# Patient Record
Sex: FEMALE | Race: WHITE | NOT HISPANIC OR LATINO | Employment: OTHER | URBAN - METROPOLITAN AREA
[De-identification: names, ages, dates, MRNs, and addresses within clinical notes are randomized per-mention and may not be internally consistent; named-entity substitution may affect disease eponyms.]

---

## 2020-02-13 ENCOUNTER — NON-PROVIDER VISIT (OUTPATIENT)
Dept: CARDIOLOGY | Facility: MEDICAL CENTER | Age: 85
End: 2020-02-13
Payer: COMMERCIAL

## 2020-02-13 DIAGNOSIS — I48.20 CHRONIC ATRIAL FIBRILLATION (HCC): ICD-10-CM

## 2020-02-13 LAB — EKG IMPRESSION: NORMAL

## 2020-02-13 PROCEDURE — G0423 INTENS CARDIAC REHAB NO EXER: HCPCS | Mod: 59 | Performed by: INTERNAL MEDICINE

## 2020-02-13 PROCEDURE — G0422 INTENS CARDIAC REHAB W/EXERC: HCPCS | Performed by: INTERNAL MEDICINE

## 2020-02-13 ASSESSMENT — PATIENT HEALTH QUESTIONNAIRE - PHQ9
SUM OF ALL RESPONSES TO PHQ QUESTIONS 1-9: 11
4. FEELING TIRED OR HAVING LITTLE ENERGY: 2
8. MOVING OR SPEAKING SO SLOWLY THAT OTHER PEOPLE COULD HAVE NOTICED. OR THE OPPOSITE, BEING SO FIGETY OR RESTLESS THAT YOU HAVE BEEN MOVING AROUND A LOT MORE THAN USUAL: 2
7. TROUBLE CONCENTRATING ON THINGS, SUCH AS READING THE NEWSPAPER OR WATCHING TELEVISION: 0
2. FEELING DOWN, DEPRESSED, IRRITABLE, OR HOPELESS: 1
1. LITTLE INTEREST OR PLEASURE IN DOING THINGS: 2
SUM OF ALL RESPONSES TO PHQ9 QUESTIONS 1 AND 2: 3
9. THOUGHTS THAT YOU WOULD BE BETTER OFF DEAD, OR OF HURTING YOURSELF: 0
5. POOR APPETITE OR OVEREATING: 2
SUM OF ALL RESPONSES TO PHQ QUESTIONS 1-9: 11
6. FEELING BAD ABOUT YOURSELF - OR THAT YOU ARE A FAILURE OR HAVE LET YOURSELF OR YOUR FAMILY DOWN: 0
3. TROUBLE FALLING OR STAYING ASLEEP OR SLEEPING TOO MUCH: 2

## 2020-02-13 NOTE — PROGRESS NOTES
Intensive Cardiac Rehabilitation (ICR) and Individual Treatment Plan (ITP) reviewed and signed.  Shirley Dickson MD

## 2020-02-13 NOTE — PROGRESS NOTES
Individualized Treatment Plan   Cardiac Rehab Individual Treatment Plan  Initial/Reassessment/Discharge Assessment/ ReAssessment/ Discharge: Initial 20 Session #     1   MRN: [id number] Allergies: Penicillins   Patient Name: Randee Franklin : 9/3/1934 Risk Stratification: High    Diagnoses:   1. Chronic atrial fibrillation     Age: 85 y.o. Physician: Rosaline Kwong M.D.    Date of Event: 01/10/20 Specialist: David   Risk Factors:  Hyperlipidemia, Age, Female > 55   Exercise Nutrition Other Core Components/ Risk Factors Psychosocial   Stages of change Stages of change Stages of change Stages of change   Preparation Preparation Preparation Preparation   Fitness Test Lipids Learning Barriers Psychosocial Plan   DASI: (n/a) Available: Yes Learning Barriers: Vision, Ready to Learn Psychosocial Plan: Yes   DIST: 859 Date: 01/10/20 Family Support Goals   Max HR: 101 Total: 162 mg/dL Yes Assess presence or absence of depression using a valid screening: Yes   Max BP: Peak Ex BP: 142/61 Tri mg/dL Lives: Alone(Has two daughters that live in the area.) Use Stress Management: Yes   RPE: 13 HDL: 71 mg/dL Other Risk Factors Plan Adverse Events: Yes   SPO2: 100 %       MET: (P) 2.23 LDL: 72 mg/dL Other Risk Factors/Plan: Yes Unexpected Events: Yes   EF= (not available) Diabetes Tobacco Use Intervention   Ambulatory Status Diabetes: No Social History     Tobacco Use   Smoking Status Never Smoker    Behavioral Health Consult: Yes   Fall Risk Assessed: Yes HbA1C: 5.8 % Date: 01/10/20 Goals Physician Referral: No   Exercise Ambulatory Status Assist Devices: None Monitors BS at Home: No Educate / Review and have understanding of cardiac disease prevention: Identifies Stressors: Yes   Exercise Plan Frequency: (n/a) Random BS: 87(1/10/2020)  Drug Intervention: N/A     Weight Management  Outcome Survey Tools   Goals Weight: 79.6 kg (175 lb 6.4 oz) Educate / Review and have understanding of cardiac disease prevention: Yes  "FP QOL Overall Score: 19.53   Home Exercise: Yes Height: 170.2 cm (5' 7\") Medication Compliance: Yes PHQ-9: 11   Mode: Walk, Other(Dance) BMI (Calculated): 27.47 Complete Tobacco Cessation: Education   Duration: 10 minutes Goal weight: 155lbs Complete Tobacco Cessation: N/A MBSR Lectures/Videos: Yes   Frequency: Stay active 7 days a week; Walk or dancing for 10 mins everyday.  ICR 3 days a week Waist: 36 inch Intervention Psychosocial Education: Coping Techniques, Positive Support System, S/S Depression, MBSR Lectures   Intervention Social History     Substance and Sexual Activity   Alcohol Use No    Smoking Cessation Referral: N/A     Intervention: Yes Nutrition Plan Ind. Education / Counseling: N/A    Exercise Prescription Nutrition Plan: Yes Tobacco Adjunct: N/A    Mode: Treadmill, NuStep, UBE, Airdyne Nutrition Related Target Goals Healthy Heart Education: Class Schedule Given, Medications Reviewed, Patient Education Binder Provided, Risk Factors Discussed, Videos Viewed per [place]    Frequency: 3 days/week LDL-C <100 if trig. > 200: Yes Weekly Lectures/ Videos/ Interactive Cooking School: Yes    Duration: 15 to 20 mins LDL-C < 70 for high risk patient:  LDL-C<70 for high risk patients: Yes Education    Intensity: 11-13 RPE  Healthy Heart Education: Class Schedule Given, Medications Reviewed, Patient Education Binder Provided, Risk Factors Discussed, Videos Viewed per Nick    METS: 1.0-3.0 Non HDL-C Should be < 130:  Non HDL-C Should be < 130: Yes Hypertension Management   (Active Problem)    Progression: ^ increments of 1-3 to THR/RPE as tolerated      THR: THR: (P) Other (see comment)(110 to 120 BPM) HbA1C < 7%: Yes Resting BP: (P) 128/56    Angina with Exercise?   Angina with Exercise: No   BMI < 25: Yes Hypertension Education      Dietary Goal: >=58 Rate Your Plate     Resistance Training? Resistance Training: Yes Rate your Plate: Pre(53) Lectures/ Videos/ Cooking School: Yes    Education Intervention " "Hypertension Goals    Yes Dietician Consult/Class: Pending Medication Adherence : Yes    Equipment Orientation, Exercise Safety, S/S to Report, RPE Scale, Warm Up / Cool Down, Physically Active Nurse/Patient Discussion: Yes Home Self Monitoring : Yes    Exercise “Target Goals” Diabetes Ed Referral: N/A     Start Individual Exercise Rx Yes Discuss Maintenance /Wt Loss: Yes       Attend Cooking School: Pending     BP < 140/90 or < 130/80 if DM or CKD Yes Education       Nutrition Education: Healthy Plant Based Eating, Sodium Reduction Cooking/ Lectures/ Cooking School/  RD 1:1     Aerobic activity 30 + min / day 5 days / wk Yes        Initial Assessment  Heart Sounds: (P) S1S2 audible and regular.          Lung Sounds: (P) Clear but diminshed         Edema: (P) None     Right Peripheral Pulses:  Carotid: (P) 2+  Radial: (P) 2+  Dorsalis Pedis: (P) 1+  Posterior Tibialis: (P) 1+      Left Peripheral Pulses:  Carotid: (P) 2+  Radial: (P) 2+  Dorsalis Pedis: (P) 1+  Posterior Tibialis: (P) 1+       Incision: (P) None      Color: (P) Skin is pink and dry    Frame Size: (P) Small       Cognitive Assessment: (P) Memory is good with a pleasant affect.    Fall Assessment:    Gait: (P) Slightly unsteady(Randee was asked to bring her cane for support.)  Balance: (P) Poor  Upper Body: (P) Full ROM  Lower Body: (P) Full ROM   Recent Falls: (P) Last October fell when [place] bump[place] the airport.     Current Medications and Vaccinations: (P) Reviewed     Patient Stated Goals: (P) \"I want to build my strength and balance and get my weight down to 155lbs.\"     Other: (P) Randee plans on starting the program tomorrow.    Randee remained in Sinus Rhythm today but has a history of Atrial Fibrillation.        Exercise    Current : (P) None  Goal: (P) ICR 3 days a week; Walk or dancing at home for 10 minutes everyday  Progress: (P) Willing to be active 7 days a week as above.     Nutrition     Current: (P) No added salt; eats red " "meat occasionally.  Goal: (P) Increase vegetable intake into daily food plan and introduce the Pritikin Eating Plan.  Progress: (P) Watched Pritikin eating plan video and planning a grocery list to have a new start.     Hypertension     Current: (P) 128/56  Goal: (P) Low sodium diet; home monitoring.  Progress: (P) No added salt diet for the past several years and is not on meds.     Stress     Current : (P) Denies stress.  \"I don't allow stress to enter my life.\"  Goal: (P) Healthy mindset lecture and video  Progress: (P) Randee has a healthy attitude towards stress.  Willing to participate in the above to develop new relaxation techniques.     Other     Notes: (P) Randee presents in Normal Sinus Rhythm with a 1st degree AV block.                 "

## 2020-02-14 ENCOUNTER — NON-PROVIDER VISIT (OUTPATIENT)
Dept: CARDIOLOGY | Facility: MEDICAL CENTER | Age: 85
End: 2020-02-14
Payer: COMMERCIAL

## 2020-02-14 DIAGNOSIS — I48.20 CHRONIC ATRIAL FIBRILLATION (HCC): ICD-10-CM

## 2020-02-14 LAB — EKG IMPRESSION: NORMAL

## 2020-02-14 PROCEDURE — G0423 INTENS CARDIAC REHAB NO EXER: HCPCS | Mod: 59 | Performed by: INTERNAL MEDICINE

## 2020-02-14 PROCEDURE — G0422 INTENS CARDIAC REHAB W/EXERC: HCPCS | Performed by: INTERNAL MEDICINE

## 2020-02-17 ENCOUNTER — NON-PROVIDER VISIT (OUTPATIENT)
Dept: CARDIOLOGY | Facility: MEDICAL CENTER | Age: 85
End: 2020-02-17
Payer: COMMERCIAL

## 2020-02-17 DIAGNOSIS — I48.20 CHRONIC ATRIAL FIBRILLATION (HCC): ICD-10-CM

## 2020-02-17 LAB — EKG IMPRESSION: NORMAL

## 2020-02-17 PROCEDURE — G0422 INTENS CARDIAC REHAB W/EXERC: HCPCS | Performed by: INTERNAL MEDICINE

## 2020-02-17 PROCEDURE — G0423 INTENS CARDIAC REHAB NO EXER: HCPCS | Mod: 59 | Performed by: INTERNAL MEDICINE

## 2020-02-17 NOTE — PROGRESS NOTES
Randee Franklin attended Educational [facility] Workshop from 10:00-11:00AM.

## 2020-02-19 ENCOUNTER — NON-PROVIDER VISIT (OUTPATIENT)
Dept: CARDIOLOGY | Facility: MEDICAL CENTER | Age: 85
End: 2020-02-19
Payer: COMMERCIAL

## 2020-02-19 DIAGNOSIS — I48.20 CHRONIC ATRIAL FIBRILLATION (HCC): ICD-10-CM

## 2020-02-19 LAB — EKG IMPRESSION: NORMAL

## 2020-02-19 PROCEDURE — G0422 INTENS CARDIAC REHAB W/EXERC: HCPCS | Performed by: INTERNAL MEDICINE

## 2020-02-19 PROCEDURE — G0423 INTENS CARDIAC REHAB NO EXER: HCPCS | Mod: 59 | Performed by: INTERNAL MEDICINE

## 2020-02-19 NOTE — PROGRESS NOTES
Randee WILLIS Rigoberto attended: cooking school from  10:00 am -11:00 am   Today  prepared creamy cauliflower and chive soup.  Patient received handouts and nutrition information regarding the specific recipes.

## 2020-02-21 ENCOUNTER — NON-PROVIDER VISIT (OUTPATIENT)
Dept: CARDIOLOGY | Facility: MEDICAL CENTER | Age: 85
End: 2020-02-21
Payer: COMMERCIAL

## 2020-02-21 DIAGNOSIS — I48.20 CHRONIC ATRIAL FIBRILLATION (HCC): ICD-10-CM

## 2020-02-21 LAB — EKG IMPRESSION: NORMAL

## 2020-02-21 PROCEDURE — G0423 INTENS CARDIAC REHAB NO EXER: HCPCS | Mod: 59 | Performed by: INTERNAL MEDICINE

## 2020-02-21 PROCEDURE — G0422 INTENS CARDIAC REHAB W/EXERC: HCPCS | Performed by: INTERNAL MEDICINE

## 2020-02-24 ENCOUNTER — NON-PROVIDER VISIT (OUTPATIENT)
Dept: CARDIOLOGY | Facility: MEDICAL CENTER | Age: 85
End: 2020-02-24
Payer: COMMERCIAL

## 2020-02-24 DIAGNOSIS — I48.20 CHRONIC ATRIAL FIBRILLATION (HCC): ICD-10-CM

## 2020-02-24 LAB — EKG IMPRESSION: NORMAL

## 2020-02-24 PROCEDURE — G0423 INTENS CARDIAC REHAB NO EXER: HCPCS | Mod: 59 | Performed by: INTERNAL MEDICINE

## 2020-02-24 PROCEDURE — G0422 INTENS CARDIAC REHAB W/EXERC: HCPCS | Performed by: INTERNAL MEDICINE

## 2020-02-26 ENCOUNTER — NON-PROVIDER VISIT (OUTPATIENT)
Dept: CARDIOLOGY | Facility: MEDICAL CENTER | Age: 85
End: 2020-02-26
Payer: COMMERCIAL

## 2020-02-26 DIAGNOSIS — I48.20 CHRONIC ATRIAL FIBRILLATION (HCC): ICD-10-CM

## 2020-02-26 LAB — EKG IMPRESSION: NORMAL

## 2020-02-26 PROCEDURE — G0422 INTENS CARDIAC REHAB W/EXERC: HCPCS | Performed by: INTERNAL MEDICINE

## 2020-02-26 PROCEDURE — G0423 INTENS CARDIAC REHAB NO EXER: HCPCS | Mod: 59 | Performed by: INTERNAL MEDICINE

## 2020-02-26 NOTE — PROGRESS NOTES
Randee Franklin attended:Cooking School from 10:00 am -11:00 am  Today  prepared Moe Camacho    Patient received handouts and nutrition information regarding the specific recipes.

## 2020-02-28 ENCOUNTER — NON-PROVIDER VISIT (OUTPATIENT)
Dept: CARDIOLOGY | Facility: MEDICAL CENTER | Age: 85
End: 2020-02-28
Payer: COMMERCIAL

## 2020-02-28 DIAGNOSIS — I48.20 CHRONIC ATRIAL FIBRILLATION (HCC): ICD-10-CM

## 2020-02-28 LAB — EKG IMPRESSION: NORMAL

## 2020-02-28 PROCEDURE — G0422 INTENS CARDIAC REHAB W/EXERC: HCPCS | Performed by: INTERNAL MEDICINE

## 2020-02-28 PROCEDURE — G0423 INTENS CARDIAC REHAB NO EXER: HCPCS | Mod: 59 | Performed by: INTERNAL MEDICINE

## 2020-03-02 ENCOUNTER — NON-PROVIDER VISIT (OUTPATIENT)
Dept: CARDIOLOGY | Facility: MEDICAL CENTER | Age: 85
End: 2020-03-02
Payer: COMMERCIAL

## 2020-03-02 DIAGNOSIS — I48.20 CHRONIC ATRIAL FIBRILLATION (HCC): ICD-10-CM

## 2020-03-02 LAB — EKG IMPRESSION: NORMAL

## 2020-03-02 PROCEDURE — G0422 INTENS CARDIAC REHAB W/EXERC: HCPCS | Performed by: INTERNAL MEDICINE

## 2020-03-02 PROCEDURE — G0423 INTENS CARDIAC REHAB NO EXER: HCPCS | Mod: 59 | Performed by: INTERNAL MEDICINE

## 2020-03-02 NOTE — PROGRESS NOTES
Randee Franklin attended: Nutrition Workshop titled Label Reading from 10:00 am - 11:00 am.  The patient received worksheets on the information.

## 2020-03-04 ENCOUNTER — NON-PROVIDER VISIT (OUTPATIENT)
Dept: CARDIOLOGY | Facility: MEDICAL CENTER | Age: 85
End: 2020-03-04
Payer: COMMERCIAL

## 2020-03-04 DIAGNOSIS — I48.20 CHRONIC ATRIAL FIBRILLATION (HCC): ICD-10-CM

## 2020-03-04 LAB — EKG IMPRESSION: NORMAL

## 2020-03-04 PROCEDURE — G0423 INTENS CARDIAC REHAB NO EXER: HCPCS | Mod: 59 | Performed by: INTERNAL MEDICINE

## 2020-03-04 PROCEDURE — G0422 INTENS CARDIAC REHAB W/EXERC: HCPCS | Performed by: INTERNAL MEDICINE

## 2020-03-04 NOTE — PROGRESS NOTES
Randee Franklin attended: Cooking School from 10:00 am - 11:00 am.  Today  prepared Chocolate Mousse and Brownie Bites.    Patient received handouts and nutrition information regarding the specific recipes.

## 2020-03-04 NOTE — PROGRESS NOTES
Individualized Treatment Plan   Cardiac Rehab Individual Treatment Plan  Initial/Reassessment/Discharge Assessment/ ReAssessment/ Discharge: (P) 30 day 20 Session #     (P) 10   MRN: [id number] Allergies: Penicillins   Patient Name: Randee Franklin : 9/3/1934 Risk Stratification: (P) High    Diagnoses:   1. Chronic atrial fibrillation     Age: 85 y.o. Physician: Rosaline Kwong M.D.    Date of Event: (P) 01/10/20 Specialist: (P) David   Risk Factors:  (P) Hyperlipidemia, Age, Female > 55   Exercise Nutrition Other Core Components/ Risk Factors Psychosocial   Stages of change Stages of change Stages of change Stages of change   (P) Preparation (P) Preparation (P) Preparation (P) Preparation   Fitness Test Lipids Learning Barriers Psychosocial Plan   DASI: (P) (n/a) Available: (P) Yes Learning Barriers: (P) Vision, Ready to Learn Psychosocial Plan: (P) Yes   DIST: (P) (pre and post only) Date: (P) 01/10/20 Family Support Goals   Max HR: (P) (pre and post only) Total: (P) 162 mg/dL (P) Yes Assess presence or absence of depression using a valid screening: (P) Yes   Max BP: Peak Ex BP: (P) (pre and post) Trig: (P) 97 mg/dL Lives: (P) Alone(Has two daughters that live in the area.) Use Stress Management: (P) Yes   RPE: (P) (pre and post only) HDL: (P) 71 mg/dL Other Risk Factors Plan Adverse Events: (P) Yes   SPO2: (P) (pre and post only)       MET: (P) (pre and post only) LDL: (P) 72 mg/dL Other Risk Factors/Plan: (P) Yes Unexpected Events: (P) Yes   EF= (P) (not available) Diabetes Tobacco Use Intervention   Ambulatory Status Diabetes: (P) No Social History     Tobacco Use   Smoking Status Never Smoker    Behavioral Health Consult: (P) Yes   Fall Risk Assessed: (P) Yes HbA1C: (P) 5.8 % Date: (P) 01/10/20 Goals Physician Referral: (P) No   Exercise Ambulatory Status Assist Devices: (P) None Monitors BS at Home: (P) No Educate / Review and have understanding of cardiac disease prevention: Identifies  "Stressors: (P) Yes   Exercise Plan Frequency: (P) (n/a) Random BS: (P) 87(1/10/2020)  Drug Intervention: (P) N/A     Weight Management  Outcome Survey Tools   Goals Weight: (P) 78.5 kg (173 lb) Educate / Review and have understanding of cardiac disease prevention: (P) Yes FP QOL Overall Score: (P) (pre and post only)   Home Exercise: (P) Yes Height: (P) 170.2 cm (5' 7\") Medication Compliance: (P) Yes PHQ-9: (P) (pre and post only)   Mode: (P) Walk, Other(Dance) BMI (Calculated): (P) 27.1 Complete Tobacco Cessation: Education   Duration: (P) 20 to 30 mins Goal weight: (P) 155lbs Complete Tobacco Cessation: (P) N/A MBSR Lectures/Videos: (P) Yes   Frequency: (P) Stay active 7 days a week; Walk or dancing for 10 mins everyday.  ICR 3 days a week Waist: (P) 36 inch Intervention Psychosocial Education: (P) Coping Techniques, Positive Support System, S/S Depression, MBSR Lectures   Intervention Social History     Substance and Sexual Activity   Alcohol Use No    Smoking Cessation Referral: (P) N/A     Intervention: (P) Yes Nutrition Plan Ind. Education / Counseling: (P) N/A    Exercise Prescription Nutrition Plan: (P) Yes Tobacco Adjunct: (P) N/A    Mode: (P) Treadmill, NuStep, UBE, Airdyne Nutrition Related Target Goals Healthy Heart Education: (P) Cooking School Attended, Medications Reviewed, Patient Education Binder Provided, Risk Factors Discussed, Videos Viewed per Nick, Workshops Attended    Frequency: (P) 3 days/week LDL-C <100 if trig. > 200: (P) Yes Weekly Lectures/ Videos/ Interactive Cooking School: (P) Yes    Duration: (P) 30 mins LDL-C < 70 for high risk patient:  LDL-C<70 for high risk patients: (P) Yes Education    Intensity: (P) 11-13 RPE  Healthy Heart Education: (P) Cooking School Attended, Medications Reviewed, Patient Education Binder Provided, Risk Factors Discussed, Videos Viewed per Pritikin, Workshops Attended    METS: (P) 1.0-3.0 Non HDL-C Should be < 130:  Non HDL-C Should be < 130: (P) Yes " "Hypertension Management   (Active Problem)    Progression: (P) ^ increments of 1-3 to THR/RPE as tolerated      THR: THR: (P) Other (see comment)(110 to 120 BPM) HbA1C < 7%: (P) Yes Resting BP: (P) 134/55    Angina with Exercise?   Angina with Exercise: (P) No   BMI < 25: (P) Yes Hypertension Education      Dietary Goal: (P) >=58 Rate Your Plate     Resistance Training? Resistance Training: (P) Yes Rate your Plate: (P) Pre(53) Lectures/ Videos/ Cooking School: (P) Yes    Education Intervention Hypertension Goals    (P) Yes Dietician Consult/Class: (P) Pending(Scheduled for March 13.) Medication Adherence : (P) Yes    (P) Exercise Safety, S/S to Report, RPE Scale, Warm Up / Cool Down, Physically Active Nurse/Patient Discussion: (P) Yes Home Self Monitoring : (P) Yes    Exercise “Target Goals” Diabetes Ed Referral: (P) N/A     Start Individual Exercise Rx (P) Yes Discuss Maintenance /Wt Loss: (P) Yes       Attend Cooking School: (P) Yes     BP < 140/90 or < 130/80 if DM or CKD (P) Yes Education       Nutrition Education: (P) Healthy Plant Based Eating, Sodium Reduction Cooking/ Lectures/ Cooking School/  RD 1:1     Aerobic activity 30 + min / day 5 days / wk (P) Yes             Exercise    Current : (P) 3 x 10 minute aerobics in ICR and initial weight routine.  Goal: (P) 15 mins X 2 aerobics and intermediate weight routine.  Progress: (P) Scheduled to meet with Juanita for a one on one on March 13, 2020.     Nutrition     Current: (P) Randee has history of a Lap Band.  She says she has been eating low fat and low sodium for years.  Goal: (P) No added salt; label reading; Add more fresh whole foods, fruits, vegetable to daily plan.  Progress: (P) Has scheduled visit with Dietician on March 13th.     Hypertension     Current: (P) 134/55  Goal: (P) Low sodium diet; home monitoring.  Progress: (P) No added salt diet for the past several years and is not on meds.     Stress     Current : (P) Denies stress.  \"I don't allow stress " "to enter my life.\"  Goal: (P) Healthy mindset lecture and video  Progress: (P) Randee has a healthy attitude towards stress.  Willing to participate in the above to develop new relaxation techniques.     Other     Notes: (P) Randee is doing very well in ICR.  She is moving up in times and workloads and is already doing an intermediate weight routine.  She is scheduled to meet with the dietician and the exercise physiologist in the middle of March and states that she feels her balance has improved greatly.  She says that she is loving the ICR program.                 "

## 2020-03-05 NOTE — PROGRESS NOTES
Intensive Cardiac Rehabilitation (ICR) and Individual Treatment Plan (ITP) reviewed and signed.   Albert Roberto M.D.

## 2020-03-06 ENCOUNTER — NON-PROVIDER VISIT (OUTPATIENT)
Dept: CARDIOLOGY | Facility: MEDICAL CENTER | Age: 85
End: 2020-03-06
Payer: COMMERCIAL

## 2020-03-06 DIAGNOSIS — I48.20 CHRONIC ATRIAL FIBRILLATION (HCC): ICD-10-CM

## 2020-03-06 LAB — EKG IMPRESSION: NORMAL

## 2020-03-06 PROCEDURE — G0422 INTENS CARDIAC REHAB W/EXERC: HCPCS | Performed by: INTERNAL MEDICINE

## 2020-03-06 PROCEDURE — G0423 INTENS CARDIAC REHAB NO EXER: HCPCS | Mod: 59 | Performed by: INTERNAL MEDICINE

## 2020-03-09 ENCOUNTER — NON-PROVIDER VISIT (OUTPATIENT)
Dept: CARDIOLOGY | Facility: MEDICAL CENTER | Age: 85
End: 2020-03-09
Payer: COMMERCIAL

## 2020-03-09 DIAGNOSIS — I48.20 CHRONIC ATRIAL FIBRILLATION (HCC): ICD-10-CM

## 2020-03-09 LAB — EKG IMPRESSION: NORMAL

## 2020-03-09 PROCEDURE — G0423 INTENS CARDIAC REHAB NO EXER: HCPCS | Mod: 59 | Performed by: INTERNAL MEDICINE

## 2020-03-09 PROCEDURE — G0422 INTENS CARDIAC REHAB W/EXERC: HCPCS | Performed by: INTERNAL MEDICINE

## 2020-03-09 NOTE — PROGRESS NOTES
Randee Franklin attended: Healthy Mindset Workshop from 10 to 11 am.      The topic was: Healthy Mindset.    Patient received handouts regarding the specific information

## 2020-03-11 ENCOUNTER — NON-PROVIDER VISIT (OUTPATIENT)
Dept: CARDIOLOGY | Facility: MEDICAL CENTER | Age: 85
End: 2020-03-11
Payer: COMMERCIAL

## 2020-03-11 DIAGNOSIS — I48.20 CHRONIC ATRIAL FIBRILLATION (HCC): ICD-10-CM

## 2020-03-11 LAB — EKG IMPRESSION: NORMAL

## 2020-03-11 PROCEDURE — G0422 INTENS CARDIAC REHAB W/EXERC: HCPCS | Performed by: INTERNAL MEDICINE

## 2020-03-11 PROCEDURE — G0423 INTENS CARDIAC REHAB NO EXER: HCPCS | Mod: 59 | Performed by: INTERNAL MEDICINE

## 2020-03-11 NOTE — PROGRESS NOTES
Randee LAZARO Rigoberto attended: Cooking School from 10:00 am- 11:00 am  Today  prepared Black Bean Burgers.    Patient received handouts and nutrition information regarding the specific recipes.

## 2020-03-12 ENCOUNTER — NON-PROVIDER VISIT (OUTPATIENT)
Dept: CARDIOLOGY | Facility: MEDICAL CENTER | Age: 85
End: 2020-03-12
Payer: COMMERCIAL

## 2020-03-12 DIAGNOSIS — I48.20 CHRONIC ATRIAL FIBRILLATION (HCC): ICD-10-CM

## 2020-03-12 LAB — EKG IMPRESSION: NORMAL

## 2020-03-12 PROCEDURE — G0423 INTENS CARDIAC REHAB NO EXER: HCPCS | Mod: 59 | Performed by: INTERNAL MEDICINE

## 2020-03-12 PROCEDURE — G0422 INTENS CARDIAC REHAB W/EXERC: HCPCS | Performed by: INTERNAL MEDICINE

## 2020-03-12 NOTE — PROGRESS NOTES
Name: Randee Franklin  Date: 3/12/20  Time: Assessment:   Time: Exercise/Stretch routine/education:   Lipids:   Cholesterol total: 218  Triglycerides: 89  HDL: 82  LDL: 118  Randee said she has new labs she will bring in.      Patient’s previous injuries: left knee, “I get injections.” neck injury 1977, right hip injury from a fall.  Patient’s Comments/ concerns: “I was depressed and not going anywhere before I came to [facility]. I never want to get back there again.”   Exercise: Randee is an 86 yo female who is very active, she exercises daily and dances 2-3 nights a week. While in [facility] Randee completes 2 exercises for 15- 20 minutes. Plus an intermediate weight routine.  Dietary: Randee admits that she eats out a lot. “I am done cooking for just myself, however I eat a lot of salads and fish when eating out and I am mindful of the sodium.” Staff talked to Randee about eating out and the sodium/fat levels are usually triple what homemade food it. Randee is aware. Randee says she has a lap band and has her eating routine down.  Stress: Randee admits that she was very depressed before coming to [facility]. She says she wasn’t going anywhere and doing anything. Since coming to [facility] Randee says she has been feeling much better and has been very social again, helping out at the [employer] and going dancing a lot. Randee feels so much better with her management of stress. If someone is bringing stress to her she avoids them and says, “No Thanks”.  Barefoot Walk: Both feet flay out to the side. Flat feel, Knees turn in.  Marching: right hip depressed, flat feet, poor balance, anterior pelvic tilt  Wall [place]: Right shoulder depressed, right shoulder rotated forward, left shoulder shortened and elevated as well as rotated forward. Good flexibility in the chest, limited flexibility in the back. Neck pain and tightness  Overhead Squat (FMS Screen):  Randee is unable to sit and stand up without pushing herself up with her hands. Her legs collapse in. heels come up.  Patient is weight shifting to the right. Limited strength in her legs. “I don’t have enough strength in my knees.”   Former[facility] named [facility] Fall / Balance Screen: Fail.  Patient is very unsteady due to weakness of her legs.  Instep L: Fail  Instep R: Pass    Heel Toe L: Fail  Heel Toe R: Fail  Leg Lift L: Fail  Leg Lift R: Fail         Goal Setting: FAST and SMART goals:  Goals: Randee has a goal to strengthen her legs and to feel more confident with her balance. Randee has a goal to work on balance and leg strength 3 times a week.  Barriers: Randee has limited coordination. Staff is willing to work with Mar 1:1 Mon, Wed, Frid, during sessions in case she needs additional help. Randee needs to start working on her balance 3 times a week to see improvement.  Plan: Staff made Randee a plan that incorporates balance and leg strengthening exercises. Staff showed Randee stretches for her arms, chest, neck and legs to also improve balance and flexibility. Randee sees the chiropractor every 3 weeks and plans to strengthen the muscles that are weak so they may hold her body in a better position in conjunction with her continuing to see her chiropractor.

## 2020-03-16 ENCOUNTER — NON-PROVIDER VISIT (OUTPATIENT)
Dept: CARDIOLOGY | Facility: MEDICAL CENTER | Age: 85
End: 2020-03-16
Payer: COMMERCIAL

## 2020-03-16 DIAGNOSIS — I48.20 CHRONIC ATRIAL FIBRILLATION (HCC): ICD-10-CM

## 2020-03-16 LAB — EKG IMPRESSION: NORMAL

## 2020-03-16 PROCEDURE — G0422 INTENS CARDIAC REHAB W/EXERC: HCPCS | Performed by: INTERNAL MEDICINE

## 2020-03-16 PROCEDURE — G0423 INTENS CARDIAC REHAB NO EXER: HCPCS | Mod: 59 | Performed by: INTERNAL MEDICINE

## 2020-03-16 NOTE — PROGRESS NOTES
Patient attended Dining out and Menu Workshop from 10:00 to 11:00am    Lecture was attended and patient questions addressed. The patient will continue workshops and nutrition education.

## 2020-03-20 ENCOUNTER — TELEPHONE (OUTPATIENT)
Dept: CARDIOLOGY | Facility: MEDICAL CENTER | Age: 85
End: 2020-03-20

## 2020-03-20 NOTE — TELEPHONE ENCOUNTER
"Randee was called to encourage maintenance of her Cardiac Rehabilitation during the current gym closure at [facility].  She states that she is feeling really good and has been doing her weight routine exercises that we sent home with her and trying to add to them every day.  She says her diet is going well, that even though she has never liked to cook she made some home made no salt/no fat bean soup yesterday.  \"I was so proud of myself.\"  Randee says she has close friends that will go to the store if she needs anything and that her grand daughter and her daughter watch out for her as well.  She was informed that she will receive a call from us next week to check on her progress.  "

## 2020-03-25 ENCOUNTER — TELEPHONE (OUTPATIENT)
Dept: CARDIOLOGY | Facility: MEDICAL CENTER | Age: 85
End: 2020-03-25

## 2020-03-25 NOTE — TELEPHONE ENCOUNTER
Message was left with Randee of new re-open date for ICR of 4/20/2020.  She was also informed that we will call to check on her weekly and inform her of any changes to our new re-open date.

## 2020-04-24 ENCOUNTER — TELEPHONE (OUTPATIENT)
Dept: CARDIOLOGY | Facility: MEDICAL CENTER | Age: 85
End: 2020-04-24

## 2020-04-24 NOTE — TELEPHONE ENCOUNTER
Randee was contacted by phone to offer Cardiac Rehabilitation support during clinic closure for the COVID-19 crisis. Randee states that she is doing well. She is exercising and riding her bike daily, eating as healthy as she can, and communicating with as many people as she can to stave off the loneliness. She is receiving the weekly emails from [facility] RD and EP and is grateful to be receiving them. She does not state any needs or concerns at this time and is understanding of the fact that [facility] does not have a predicted date to re-open at this time. She was reassured that staff will continue to be in contact providing support and updates as they come.

## 2020-06-01 ENCOUNTER — NON-PROVIDER VISIT (OUTPATIENT)
Dept: CARDIOLOGY | Facility: MEDICAL CENTER | Age: 85
End: 2020-06-01
Payer: COMMERCIAL

## 2020-06-01 DIAGNOSIS — I48.20 CHRONIC ATRIAL FIBRILLATION (HCC): ICD-10-CM

## 2020-06-01 LAB — EKG IMPRESSION: NORMAL

## 2020-06-01 PROCEDURE — G0423 INTENS CARDIAC REHAB NO EXER: HCPCS | Mod: 59 | Performed by: INTERNAL MEDICINE

## 2020-06-01 PROCEDURE — G0422 INTENS CARDIAC REHAB W/EXERC: HCPCS | Performed by: INTERNAL MEDICINE

## 2020-06-01 NOTE — PROGRESS NOTES
Randee Rigoberto attended Nutrition Lecture from 8-9AM. The topic was Fueling a Healthy Body. All questions were answered.

## 2020-06-01 NOTE — PROGRESS NOTES
Individualized Treatment Plan   Cardiac Rehab Individual Treatment Plan  Initial/Reassessment/Discharge Assessment/ ReAssessment/ Discharge: 90 day 20 Session #     16   MRN: [id number] Allergies: Penicillins   Patient Name: Randee Franklin : 9/3/1934 Risk Stratification: High    Diagnoses:   1. Chronic atrial fibrillation (HCC)     Age: 85 y.o. Physician: Rosaline Kwong M.D.    Date of Event: 01/10/20 Specialist: David   Risk Factors:  Hyperlipidemia, Age, Female > 55   Exercise Nutrition Other Core Components/ Risk Factors Psychosocial   Stages of change Stages of change Stages of change Stages of change   Preparation Preparation Preparation Preparation   Fitness Test Lipids Learning Barriers Psychosocial Plan   DASI: (n/a) Available: Yes Learning Barriers: Vision, Ready to Learn Psychosocial Plan: Yes   DIST: (pre and post only) Date: 01/10/20 Family Support Goals   Max HR: (pre and post only) Total: 162 mg/dL Yes Assess presence or absence of depression using a valid screening: Yes   Max BP: Peak Ex BP: (pre and post) Tri mg/dL Lives: Alone(Has two daughters that live in the area.) Use Stress Management: Yes   RPE: (pre and post only) HDL: 71 mg/dL Other Risk Factors Plan Adverse Events: Yes   SPO2: (pre and post only)       MET: (pre and post only) LDL: 72 mg/dL Other Risk Factors/Plan: Yes Unexpected Events: Yes   EF= (not available) Diabetes Tobacco Use Intervention   Ambulatory Status Diabetes: No Social History     Tobacco Use   Smoking Status Never Smoker    Behavioral Health Consult: Yes   Fall Risk Assessed: Yes HbA1C: 5.8 % Date: 01/10/20 Goals Physician Referral: No   Exercise Ambulatory Status Assist Devices: None Monitors BS at Home: No Educate / Review and have understanding of cardiac disease prevention: Identifies Stressors: Yes   Exercise Plan Frequency: (n/a) Random BS: 87(1/10/2020)  Drug Intervention: N/A     Weight Management  Outcome Survey Tools   Goals Weight: 78.5 kg  "(173 lb) Educate / Review and have understanding of cardiac disease prevention: Yes FP QOL Overall Score: (pre and post only)   Home Exercise: Yes Height: 170.2 cm (5' 7\") Medication Compliance: Yes PHQ-9: (pre and post only)   Mode: Walk, Other(Dance) BMI (Calculated): 27.1 Complete Tobacco Cessation: Education   Duration: 20 to 30 mins Goal weight: 155lbs Complete Tobacco Cessation: N/A MBSR Lectures/Videos: Yes   Frequency: Stay active 7 days a week; Walk or dancing for 10 mins everyday.  ICR 3 days a week Waist: 36 inch Intervention Psychosocial Education: Coping Techniques, Positive Support System, S/S Depression, MBSR Lectures   Intervention Social History     Substance and Sexual Activity   Alcohol Use No    Smoking Cessation Referral: N/A     Intervention: Yes Nutrition Plan Ind. Education / Counseling: N/A    Exercise Prescription Nutrition Plan: Yes Tobacco Adjunct: N/A    Mode: Treadmill, NuStep, UBE, Airdyne Nutrition Related Target Goals Healthy Heart Education: Cooking School Attended, Medications Reviewed, Patient Education Binder Provided, Risk Factors Discussed, Videos Viewed per Pritimichi, Workshops Attended    Frequency: 3 days/week LDL-C <100 if trig. > 200: Yes Weekly Lectures/ Videos/ Interactive Cooking School: Yes    Duration: 30 mins LDL-C < 70 for high risk patient:  LDL-C<70 for high risk patients: Yes Education    Intensity: 11-13 RPE  Healthy Heart Education: Cooking School Attended, Medications Reviewed, Patient Education Binder Provided, Risk Factors Discussed, Videos Viewed per Pritikin, Workshops Attended    METS: 1.0-3.0 Non HDL-C Should be < 130:  Non HDL-C Should be < 130: Yes Hypertension Management   (Active Problem)    Progression: ^ increments of 1-3 to THR/RPE as tolerated      THR: THR: Other (see comment)(110 to 120 BPM) HbA1C < 7%: Yes Resting BP: 134/55    Angina with Exercise?   Angina with Exercise: No   BMI < 25: Yes Hypertension Education      Dietary Goal: >=58 Rate " "Your Plate     Resistance Training? Resistance Training: Yes Rate your Plate: Pre(53) Lectures/ Videos/ Cooking School: Yes    Education Intervention Hypertension Goals    Yes Dietician Consult/Class: Pending(Scheduled for March 13.) Medication Adherence : Yes    Exercise Safety, S/S to Report, RPE Scale, Warm Up / Cool Down, Physically Active Nurse/Patient Discussion: Yes Home Self Monitoring : Yes    Exercise “Target Goals” Diabetes Ed Referral: N/A     Start Individual Exercise Rx Yes Discuss Maintenance /Wt Loss: Yes       Attend Cooking School: Yes     BP < 140/90 or < 130/80 if DM or CKD Yes Education       Nutrition Education: Healthy Plant Based Eating, Sodium Reduction Cooking/ Lectures/ Cooking School/  RD 1:1     Aerobic activity 30 + min / day 5 days / wk Yes          Exercise    Current : While [facility] was closed Elsa was doing her weight routine that she learned during her one-on-one with the physiologist. Elsa has also been riding her bike for 20 minutes a day.  Goal: 30 mins of aerobics on one piece of equipment.  Progress: Elsa has a goal to get her stamina back up to 30 continuous minutes and to come to [facility] consistently again.     Nutrition     Current: (P) Elsa says she was pretty good while [facility] was closed. She made soups and tried new recipes. Elsa says she was aware of salt,fat and sugar and made her meals as healthy as she could.  Goal: No added salt; label reading; Add more fresh whole foods, fruits, vegetable to daily plan.  Progress: (P) Elsa admits she had some rough days without having class to reinforce her good eating behaviors, but she feels she did pretty good.      Hypertension     Current: (P) 121/55 WNL.  Goal: Low sodium diet; home monitoring.  Progress: No added salt diet for the past several years and is not on meds.     Stress     Current : Denies stress.  \"I don't allow stress to enter my life.\"  Goal: Keep a healthy mindset during this COVID-19 crisis.  Progress: (P) Elsa is happy " that [facility] is open and is happy to socialize again with staff at [facility].     Other

## 2020-06-03 ENCOUNTER — NON-PROVIDER VISIT (OUTPATIENT)
Dept: CARDIOLOGY | Facility: MEDICAL CENTER | Age: 85
End: 2020-06-03
Payer: COMMERCIAL

## 2020-06-03 DIAGNOSIS — I48.20 CHRONIC ATRIAL FIBRILLATION (HCC): ICD-10-CM

## 2020-06-03 LAB — EKG IMPRESSION: NORMAL

## 2020-06-03 PROCEDURE — G0422 INTENS CARDIAC REHAB W/EXERC: HCPCS | Performed by: INTERNAL MEDICINE

## 2020-06-03 PROCEDURE — G0423 INTENS CARDIAC REHAB NO EXER: HCPCS | Mod: 59 | Performed by: INTERNAL MEDICINE

## 2020-06-08 ENCOUNTER — NON-PROVIDER VISIT (OUTPATIENT)
Dept: CARDIOLOGY | Facility: MEDICAL CENTER | Age: 85
End: 2020-06-08
Payer: COMMERCIAL

## 2020-06-08 DIAGNOSIS — I48.20 CHRONIC ATRIAL FIBRILLATION (HCC): ICD-10-CM

## 2020-06-08 LAB — EKG IMPRESSION: NORMAL

## 2020-06-08 PROCEDURE — G0423 INTENS CARDIAC REHAB NO EXER: HCPCS | Mod: 59 | Performed by: INTERNAL MEDICINE

## 2020-06-08 PROCEDURE — G0422 INTENS CARDIAC REHAB W/EXERC: HCPCS | Performed by: INTERNAL MEDICINE

## 2020-06-08 NOTE — PROGRESS NOTES
Randee Franklin attended: Exercise Workshop from 8 to 9 am.      The topic was: Biomechanical Limitations.    Patient received handouts regarding the specific exercise information

## 2020-06-10 ENCOUNTER — NON-PROVIDER VISIT (OUTPATIENT)
Dept: CARDIOLOGY | Facility: MEDICAL CENTER | Age: 85
End: 2020-06-10
Payer: COMMERCIAL

## 2020-06-10 DIAGNOSIS — I48.20 CHRONIC ATRIAL FIBRILLATION (HCC): ICD-10-CM

## 2020-06-10 LAB — EKG IMPRESSION: NORMAL

## 2020-06-10 PROCEDURE — G0423 INTENS CARDIAC REHAB NO EXER: HCPCS | Mod: 59 | Performed by: INTERNAL MEDICINE

## 2020-06-10 PROCEDURE — G0422 INTENS CARDIAC REHAB W/EXERC: HCPCS | Performed by: INTERNAL MEDICINE

## 2020-06-10 NOTE — PROGRESS NOTES
"Nutrition Consult Note:    Lavinia Bailey, ZULLY, LD, CDE  Date of visit:      6/10/2020    Referring MD:    Dr. Flores   Referring diagnosis:  A fib    Diagnosis code: I48.91    Time:  8:00-8:45am        Randee Franklin is here today for Intensive Cardiac Rehab. Today he had his one-on-one RD appointment.This program includes Nutrition education and counseling following the Pritikin guidelines, which promote whole foods-fruits, vegetables, beans/legumes, whole grains, lean animal protein and not fat dairy-while avoiding added salt, refined/salty/sugary/fatty processed foods, trans/saturated fat, added sugar, tropical oils and heavy use of added oils.      Past Medical History:   A-fib, HTN, Lap band bariatric surgery 2009     Anthropometrics:  Height: 67\"  Weight:  172lb today   BMI:  26.9   Goal weight: 155lb    Weight hx: Pt reports 155lb weight loss after lap band. Last year when she was sick gained 20lb due to emotional eating/ depression. Has since lost 12lb but wants to lost more weight to goal of 155lb     Diet History:  Breakfast:  Bariatric protein powder mixed with water , or poached egg with whole wheat toast 1 tsp butter   Lunch:  salad with various veggie and fruit. Limit salad dressing or apple with peanut butter   Dinner: fish or chx (avoids red meat) with vegetables and brown rice   Snacks: rarely snacks. But lately has been eating desserts (ice cream especially).   Beverages: water, tea, decaf coffee. 1x per week has sweet tea but dilutes it       Current Exercise Minutes:  60mins at ICR 2 days per week   additional exercise not discussed today      Positive Changes Since Beginning the Program:   1. Less saturated fat. Avoiding red meat and cheese; chooses low fat dairy   2. No added salt  3. Read labels for sodium, calories and added sugar     Barriers to Change/Biggest Struggles:  1.  loneliness  And being self quarantined alone at home for the past 3 months        Nutrition Goals:  1. Keep desserts out " of the house; replace with a fruit or SF jello if craving sweets   2. Continue to choose whole grains, 5 veggies, 2+ fruits per day  3. Replace saturated fats with unsaturated fats but keep servings to a minimum for calorie control     Assessment: Randee had been doing very well with a heart healthy eating plan prior to the stay at home orders. She has been feeling lonely and this has led to emotional eating. We discussed non eating activities to help fill her time and cheer her mood. She has good family support which is helping. She feels the best she can do with her diet is to keep sweets out of the house and fill her fridge with more fruits and vegetables. She is highly motivated and once she sets her mind to it she commits fully.        Educational Handouts Provided:  1. Pritikin Eating Plan   2. Plate method   3. Label Readings guidelines   4. Heart healthy snack ideas     Follow up PRN

## 2020-06-15 ENCOUNTER — NON-PROVIDER VISIT (OUTPATIENT)
Dept: CARDIOLOGY | Facility: MEDICAL CENTER | Age: 85
End: 2020-06-15
Payer: COMMERCIAL

## 2020-06-15 DIAGNOSIS — I48.20 CHRONIC ATRIAL FIBRILLATION (HCC): ICD-10-CM

## 2020-06-15 LAB — EKG IMPRESSION: NORMAL

## 2020-06-15 PROCEDURE — G0423 INTENS CARDIAC REHAB NO EXER: HCPCS | Mod: 59 | Performed by: INTERNAL MEDICINE

## 2020-06-15 PROCEDURE — G0422 INTENS CARDIAC REHAB W/EXERC: HCPCS | Performed by: INTERNAL MEDICINE

## 2020-06-15 NOTE — PROGRESS NOTES
Randee Franklin attended: attended the following workshop from 8:00am- 9:00 am  Workshop Title: Taking Charge of Stress.      Lecture was attended and patient questions addressed. The patient will continue workshops and nutrition education.

## 2020-06-17 ENCOUNTER — NON-PROVIDER VISIT (OUTPATIENT)
Dept: CARDIOLOGY | Facility: MEDICAL CENTER | Age: 85
End: 2020-06-17
Payer: COMMERCIAL

## 2020-06-17 DIAGNOSIS — I48.20 CHRONIC ATRIAL FIBRILLATION (HCC): ICD-10-CM

## 2020-06-17 LAB — EKG IMPRESSION: NORMAL

## 2020-06-17 PROCEDURE — G0422 INTENS CARDIAC REHAB W/EXERC: HCPCS | Performed by: INTERNAL MEDICINE

## 2020-06-17 PROCEDURE — G0423 INTENS CARDIAC REHAB NO EXER: HCPCS | Mod: 59 | Performed by: INTERNAL MEDICINE

## 2020-06-17 NOTE — PROGRESS NOTES
Video watched: 06 Label Reading Part 1. Length: 32.25 minutes.  Video and discussion took place from 8 to 8:50 am.

## 2020-06-22 ENCOUNTER — NON-PROVIDER VISIT (OUTPATIENT)
Dept: CARDIOLOGY | Facility: MEDICAL CENTER | Age: 85
End: 2020-06-22
Payer: COMMERCIAL

## 2020-06-22 DIAGNOSIS — I48.20 CHRONIC ATRIAL FIBRILLATION (HCC): ICD-10-CM

## 2020-06-22 LAB — EKG IMPRESSION: NORMAL

## 2020-06-22 PROCEDURE — G0423 INTENS CARDIAC REHAB NO EXER: HCPCS | Mod: 59 | Performed by: INTERNAL MEDICINE

## 2020-06-22 PROCEDURE — G0422 INTENS CARDIAC REHAB W/EXERC: HCPCS | Performed by: INTERNAL MEDICINE

## 2020-06-22 NOTE — PROGRESS NOTES
Randee Franklin attended the following workshop from 8 to 9 am.  Workshop Title: Label Reading.      Lecture was attended and patient questions addressed. The patient will continue workshops and nutrition education.

## 2020-06-22 NOTE — PROGRESS NOTES
Individualized Treatment Plan   Cardiac Rehab Individual Treatment Plan  Initial/Reassessment/Discharge Assessment/ ReAssessment/ Discharge: (P) 120 Day 20 Session #     (P) 22   MRN: [id number] Allergies: Penicillins   Patient Name: Randee Franklin : 9/3/1934 Risk Stratification: (P) High    Diagnoses:   1. Chronic atrial fibrillation (HCC)     Age: 85 y.o. Physician: Rosaline Kwong M.D.    Date of Event: (P) 01/10/20 Specialist: (P) David   Risk Factors:  (P) Hyperlipidemia, Age, Female > 55   Exercise Nutrition Other Core Components/ Risk Factors Psychosocial   Stages of change Stages of change Stages of change Stages of change   (P) Preparation (P) Preparation (P) Preparation (P) Preparation   Fitness Test Lipids Learning Barriers Psychosocial Plan   DASI: (P) (n/a) Available: (P) Yes Learning Barriers: (P) Vision, Ready to Learn Psychosocial Plan: (P) Yes   DIST: (P) (pre and post only) Date: (P) 01/10/20 Family Support Goals   Max HR: (P) (pre and post only) Total: (P) 162 mg/dL (P) Yes Assess presence or absence of depression using a valid screening: (P) Yes   Max BP: Peak Ex BP: (P) (pre and post) Trig: (P) 97 mg/dL Lives: (P) Alone(Has two daughters that live in the area.) Use Stress Management: (P) Yes   RPE: (P) (pre and post only) HDL: (P) 71 mg/dL Other Risk Factors Plan Adverse Events: (P) Yes   SPO2: (P) (pre and post only)       MET: (P) (pre and post only) LDL: (P) 72 mg/dL Other Risk Factors/Plan: (P) Yes Unexpected Events: (P) Yes   EF= (P) (not available) Diabetes Tobacco Use Intervention   Ambulatory Status Diabetes: (P) No Social History     Tobacco Use   Smoking Status Never Smoker    Behavioral Health Consult: (P) Yes   Fall Risk Assessed: (P) Yes HbA1C: (P) 5.8 % Date: (P) 01/10/20 Goals Physician Referral: (P) No   Exercise Ambulatory Status Assist Devices: (P) None Monitors BS at Home: (P) No Educate / Review and have understanding of cardiac disease prevention: Identifies  "Stressors: (P) Yes   Exercise Plan Frequency: (P) (n/a) Random BS: (P) 87(1/10/2020)  Drug Intervention: (P) N/A     Weight Management  Outcome Survey Tools   Goals Weight: (P) 77.6 kg (171 lb) Educate / Review and have understanding of cardiac disease prevention: (P) Yes FP QOL Overall Score: (P) (pre and post only)   Home Exercise: (P) Yes Height: (P) 170.2 cm (5' 7\") Medication Compliance: (P) Yes PHQ-9: (P) (pre and post only)   Mode: (P) Walk, Other(Dance) BMI (Calculated): (P) 26.78 Complete Tobacco Cessation: Education   Duration: (P) 20 to 30 mins Goal weight: (P) 155lbs Complete Tobacco Cessation: (P) N/A MBSR Lectures/Videos: (P) Yes   Frequency: (P) Stay active 7 days a week; Walk or dancing for 10 mins everyday.  ICR 3 days a week Waist: (P) 36 inch Intervention Psychosocial Education: (P) Coping Techniques, Positive Support System, S/S Depression, MBSR Lectures   Intervention Social History     Substance and Sexual Activity   Alcohol Use No    Smoking Cessation Referral: (P) N/A     Intervention: (P) Yes Nutrition Plan Ind. Education / Counseling: (P) N/A    Exercise Prescription Nutrition Plan: (P) Yes Tobacco Adjunct: (P) N/A    Mode: (P) Treadmill, NuStep, UBE, Airdyne Nutrition Related Target Goals Healthy Heart Education: (P) Cooking School Attended, Medications Reviewed, Patient Education Binder Provided, Risk Factors Discussed, Videos Viewed per Nick, Workshops Attended    Frequency: (P) 3 days/week LDL-C <100 if trig. > 200: (P) Yes Weekly Lectures/ Videos/ Interactive Cooking School: (P) Yes    Duration: (P) 30 mins LDL-C < 70 for high risk patient:  LDL-C<70 for high risk patients: (P) Yes Education    Intensity: (P) 11-13 RPE  Healthy Heart Education: (P) Cooking School Attended, Medications Reviewed, Patient Education Binder Provided, Risk Factors Discussed, Videos Viewed per Pritikin, Workshops Attended    METS: (P) 1.0-3.0 Non HDL-C Should be < 130:  Non HDL-C Should be < 130: (P) Yes " Hypertension Management   (Active Problem)    Progression: (P) ^ increments of 1-3 to THR/RPE as tolerated      THR: THR: (P) Other (see comment)(110 to 120 BPM) HbA1C < 7%: (P) Yes Resting BP: (P) 134/55    Angina with Exercise?   Angina with Exercise: (P) No   BMI < 25: (P) Yes Hypertension Education      Dietary Goal: (P) >=58 Rate Your Plate     Resistance Training? Resistance Training: (P) Yes Rate your Plate: (P) Pre(53) Lectures/ Videos/ Cooking School: (P) Yes    Education Intervention Hypertension Goals    (P) Yes Dietician Consult/Class: (P) Pending(Scheduled for March 13.) Medication Adherence : (P) Yes    (P) Exercise Safety, S/S to Report, RPE Scale, Warm Up / Cool Down, Physically Active Nurse/Patient Discussion: (P) Yes Home Self Monitoring : (P) Yes    Exercise “Target Goals” Diabetes Ed Referral: (P) N/A     Start Individual Exercise Rx (P) Yes Discuss Maintenance /Wt Loss: (P) Yes       Attend Cooking School: (P) Yes     BP < 140/90 or < 130/80 if DM or CKD (P) Yes Education       Nutrition Education: (P) Healthy Plant Based Eating, Sodium Reduction Cooking/ Lectures/ Cooking School/  RD 1:1     Aerobic activity 30 + min / day 5 days / wk (P) Yes          Exercise    Current : (P) While Elsa is at [facility] she completes aerobics for 30 minutes plus a group exercise routine of push then pull. On the days Elsa is at home she rides a bike for 30 minutes.  Goal: (P) Increase workloads.  Progress: (P) Elsa has a reached her goal to get her stamina back up to 30 continuous minutes and to come to [facility] consistently again.     Nutrition     Current: (P) Elsa feels that she is doing well with her diet and exercise at this time.  Goal: (P) No added salt; label reading; Add more fresh whole foods, fruits, vegetable to daily plan.  Progress: (P) Elsa has been doing alot better avoiding fatty/salty/sugary foods since she has started coming to [facility] again.     Hypertension     Current: (P) 135/57 WNL.  Goal: (P) Low  "sodium diet; home monitoring.  Progress: (P) No added salt diet for the past several years and is not on meds.     Stress     Current : (P) Denies stress.  \"I don't allow stress to enter my life.\"  Goal: (P) Keep a healthy mindset during this COVID-19 crisis.  Progress: (P) Randee is happy that [facility] is open and is happy to socialize again with staff at [facility].     Other                       "

## 2020-06-23 NOTE — PROGRESS NOTES
Intensive cardiac rehabilitation (ICR) individual treatment plan (ITP) reviewed and signed.    Dyana Osullivan MD, [facility]  Cardiologist  [facility]

## 2020-06-24 ENCOUNTER — NON-PROVIDER VISIT (OUTPATIENT)
Dept: CARDIOLOGY | Facility: MEDICAL CENTER | Age: 85
End: 2020-06-24
Payer: COMMERCIAL

## 2020-06-24 DIAGNOSIS — I48.20 CHRONIC ATRIAL FIBRILLATION (HCC): ICD-10-CM

## 2020-06-24 LAB — EKG IMPRESSION: NORMAL

## 2020-06-24 PROCEDURE — G0422 INTENS CARDIAC REHAB W/EXERC: HCPCS | Performed by: INTERNAL MEDICINE

## 2020-06-24 PROCEDURE — G0423 INTENS CARDIAC REHAB NO EXER: HCPCS | Mod: 59 | Performed by: INTERNAL MEDICINE

## 2020-06-24 NOTE — PROGRESS NOTES
"Problem: Mood Impairment (Depressive Signs/Symptoms) (Adult)  Goal: Improved Mood Symptoms (Depressive Signs/Symptoms)  Outcome: No Change  38 year old female admitted from [facility] ED at 22:57. Upon arrival, she cooperated with search. She is concerned and upset that she said she left \"sultana earrings\" in a specimen cup in the ED. [facility] EMT promised to call to try to locate these. Client quite tearful and worried about her job. Says \"this is going to ruin my life. Cooperative with the search.      " Video watched: 09 Diseases of Our Time Part 1. Length: 34.4 minutes.  Following video, group discussion question and answer session completed till 0850.

## 2020-06-29 ENCOUNTER — NON-PROVIDER VISIT (OUTPATIENT)
Dept: CARDIOLOGY | Facility: MEDICAL CENTER | Age: 85
End: 2020-06-29
Payer: COMMERCIAL

## 2020-06-29 DIAGNOSIS — I48.20 CHRONIC ATRIAL FIBRILLATION (HCC): ICD-10-CM

## 2020-06-29 LAB — EKG IMPRESSION: NORMAL

## 2020-06-29 PROCEDURE — G0422 INTENS CARDIAC REHAB W/EXERC: HCPCS | Performed by: INTERNAL MEDICINE

## 2020-06-29 PROCEDURE — G0423 INTENS CARDIAC REHAB NO EXER: HCPCS | Mod: 59 | Performed by: INTERNAL MEDICINE

## 2020-06-29 NOTE — PROGRESS NOTES
Randee WILLIS Rigoberto attended: cooking school from  [id number]. Today  prepared Marinara Sauce.    Patient received handouts and nutrition information regarding the specific recipes.

## 2020-07-01 ENCOUNTER — NON-PROVIDER VISIT (OUTPATIENT)
Dept: CARDIOLOGY | Facility: MEDICAL CENTER | Age: 85
End: 2020-07-01
Payer: COMMERCIAL

## 2020-07-01 DIAGNOSIS — I48.20 CHRONIC ATRIAL FIBRILLATION (HCC): ICD-10-CM

## 2020-07-01 LAB — EKG IMPRESSION: NORMAL

## 2020-07-01 PROCEDURE — G0422 INTENS CARDIAC REHAB W/EXERC: HCPCS | Performed by: INTERNAL MEDICINE

## 2020-07-01 PROCEDURE — G0423 INTENS CARDIAC REHAB NO EXER: HCPCS | Mod: 59 | Performed by: INTERNAL MEDICINE

## 2020-07-06 ENCOUNTER — NON-PROVIDER VISIT (OUTPATIENT)
Dept: CARDIOLOGY | Facility: MEDICAL CENTER | Age: 85
End: 2020-07-06
Payer: COMMERCIAL

## 2020-07-06 DIAGNOSIS — I48.20 CHRONIC ATRIAL FIBRILLATION (HCC): ICD-10-CM

## 2020-07-06 LAB — EKG IMPRESSION: NORMAL

## 2020-07-06 PROCEDURE — G0422 INTENS CARDIAC REHAB W/EXERC: HCPCS | Performed by: INTERNAL MEDICINE

## 2020-07-06 PROCEDURE — G0423 INTENS CARDIAC REHAB NO EXER: HCPCS | Mod: 59 | Performed by: INTERNAL MEDICINE

## 2020-07-06 NOTE — PROGRESS NOTES
Randee Franklin attended the following workshop from 0800 to 0855  Workshop Title: Balance Training Fall Prevention      Lecture was attended and patient questions addressed. The patient will continue workshops and nutrition education.

## 2020-07-08 ENCOUNTER — NON-PROVIDER VISIT (OUTPATIENT)
Dept: CARDIOLOGY | Facility: MEDICAL CENTER | Age: 85
End: 2020-07-08
Payer: COMMERCIAL

## 2020-07-08 DIAGNOSIS — I48.20 CHRONIC ATRIAL FIBRILLATION (HCC): ICD-10-CM

## 2020-07-08 LAB — EKG IMPRESSION: NORMAL

## 2020-07-08 PROCEDURE — G0423 INTENS CARDIAC REHAB NO EXER: HCPCS | Mod: 59 | Performed by: INTERNAL MEDICINE

## 2020-07-08 PROCEDURE — G0422 INTENS CARDIAC REHAB W/EXERC: HCPCS | Performed by: INTERNAL MEDICINE

## 2020-07-08 NOTE — PROGRESS NOTES
Video watched: 19 Heart Disease Risk Reduction. Length: 32.2 minutes with discussion following.  From 0800 To 0850.

## 2020-07-13 ENCOUNTER — NON-PROVIDER VISIT (OUTPATIENT)
Dept: CARDIOLOGY | Facility: MEDICAL CENTER | Age: 85
End: 2020-07-13
Payer: COMMERCIAL

## 2020-07-13 DIAGNOSIS — I48.20 CHRONIC ATRIAL FIBRILLATION (HCC): ICD-10-CM

## 2020-07-13 LAB — EKG IMPRESSION: NORMAL

## 2020-07-13 PROCEDURE — G0423 INTENS CARDIAC REHAB NO EXER: HCPCS | Mod: 59 | Performed by: INTERNAL MEDICINE

## 2020-07-13 PROCEDURE — G0422 INTENS CARDIAC REHAB W/EXERC: HCPCS | Performed by: INTERNAL MEDICINE

## 2020-07-13 NOTE — PROGRESS NOTES
Randee Franklin attended: Healthy Mindset Workshop from 8:00 am to 8:50 am    The topic was: Stress and Health.  Patient received handouts regarding the specific information.

## 2020-07-15 ENCOUNTER — NON-PROVIDER VISIT (OUTPATIENT)
Dept: CARDIOLOGY | Facility: MEDICAL CENTER | Age: 85
End: 2020-07-15
Payer: COMMERCIAL

## 2020-07-15 DIAGNOSIS — I48.20 CHRONIC ATRIAL FIBRILLATION (HCC): ICD-10-CM

## 2020-07-15 LAB — EKG IMPRESSION: NORMAL

## 2020-07-15 PROCEDURE — G0422 INTENS CARDIAC REHAB W/EXERC: HCPCS | Performed by: INTERNAL MEDICINE

## 2020-07-15 PROCEDURE — G0423 INTENS CARDIAC REHAB NO EXER: HCPCS | Mod: 59 | Performed by: INTERNAL MEDICINE

## 2020-07-15 NOTE — PROGRESS NOTES
Video watched: 08 Dining Out Part 1. Length: 32.5 minutes.  Video was followed by discussion and took place from 8 am to 8:50 am.

## 2020-07-15 NOTE — PROGRESS NOTES
Individualized Treatment Plan   Cardiac Rehab Individual Treatment Plan  Initial/Reassessment/Discharge Assessment/ ReAssessment/ Discharge: (P) 150 Day 07/15/20 Session #     P) 74   MRN: [id number] Allergies: Penicillins   Patient Name: Randee Franklin : 9/3/1934 Risk Stratification: (P) High    Diagnoses:   1. Chronic atrial fibrillation (HCC)     Age: 85 y.o. Physician: Rosaline Kwong M.D.    Date of Event: (P) 01/10/20 Specialist: (P) David   Risk Factors:  (P) Hyperlipidemia, Age, Female > 55   Exercise Nutrition Other Core Components/ Risk Factors Psychosocial   Stages of change Stages of change Stages of change Stages of change   (P) Preparation (P) Preparation (P) Preparation (P) Preparation   Fitness Test Lipids Learning Barriers Psychosocial Plan   DASI: (P) (n/a) Available: (P) Yes Learning Barriers: (P) Vision, Ready to Learn Psychosocial Plan: (P) Yes   DIST: (P) (pre and post only) Date: (P) 01/10/20 Family Support Goals   Max HR: (P) (pre and post only) Total: (P) 162 mg/dL (P) Yes Assess presence or absence of depression using a valid screening: (P) Yes   Max BP: Peak Ex BP: (P) (pre and post) Trig: (P) 97 mg/dL Lives: (P) Alone(Has two daughters that live in the area.) Use Stress Management: (P) Yes   RPE: (P) (pre and post only) HDL: (P) 71 mg/dL Other Risk Factors Plan Adverse Events: (P) Yes   SPO2: (P) (pre and post only)       MET: (P) (pre and post only) LDL: (P) 72 mg/dL Other Risk Factors/Plan: (P) Yes Unexpected Events: (P) Yes   EF= (P) (not available) Diabetes Tobacco Use Intervention   Ambulatory Status Diabetes: (P) No Social History     Tobacco Use   Smoking Status Never Smoker    Behavioral Health Consult: (P) Yes   Fall Risk Assessed: (P) Yes HbA1C: (P) 5.8 % Date: (P) 01/10/20 Goals Physician Referral: (P) No   Exercise Ambulatory Status Assist Devices: (P) None Monitors BS at Home: (P) No Educate / Review and have understanding of cardiac disease prevention: Identifies  "Stressors: (P) Yes   Exercise Plan Frequency: (P) (n/a) Random BS: (P) 87(1/10/2020)  Drug Intervention: (P) N/A     Weight Management  Outcome Survey Tools   Goals Weight: (P) 77.1 kg (170 lb) Educate / Review and have understanding of cardiac disease prevention: (P) Yes FP QOL Overall Score: (P) (pre and post only)   Home Exercise: (P) Yes Height: (P) 170.2 cm (5' 7\") Medication Compliance: (P) Yes PHQ-9: (P) (pre and post only)   Mode: (P) Walk, Other(Dance) BMI (Calculated): (P) 26.63 Complete Tobacco Cessation: Education   Duration: (P) 20 to 30 mins Goal weight: (P) 155lbs Complete Tobacco Cessation: (P) N/A MBSR Lectures/Videos: (P) Yes   Frequency: (P) Stay active 7 days a week; Walk or dancing for 10 mins everyday.  ICR 3 days a week Waist: (P) 36 inch Intervention Psychosocial Education: (P) Coping Techniques, Positive Support System, S/S Depression, MBSR Lectures   Intervention Social History     Substance and Sexual Activity   Alcohol Use No    Smoking Cessation Referral: (P) N/A     Intervention: (P) Yes Nutrition Plan Ind. Education / Counseling: (P) N/A    Exercise Prescription Nutrition Plan: (P) Yes Tobacco Adjunct: (P) N/A    Mode: (P) Treadmill, NuStep, UBE, Airdyne Nutrition Related Target Goals Healthy Heart Education: (P) Cooking School Attended, Medications Reviewed, Patient Education Binder Provided, Risk Factors Discussed, Videos Viewed per Nick, Workshops Attended    Frequency: (P) 3 days/week LDL-C <100 if trig. > 200: (P) Yes Weekly Lectures/ Videos/ Interactive Cooking School: (P) Yes    Duration: (P) 30 mins LDL-C < 70 for high risk patient:  LDL-C<70 for high risk patients: (P) Yes Education    Intensity: (P) 11-13 RPE  Healthy Heart Education: (P) Cooking School Attended, Medications Reviewed, Patient Education Binder Provided, Risk Factors Discussed, Videos Viewed per Pritikin, Workshops Attended    METS: (P) 3.0-5.0 Non HDL-C Should be < 130:  Non HDL-C Should be < 130: (P) Yes " Hypertension Management   (Active Problem)    Progression: (P) ^ increments of 1-3 to THR/RPE as tolerated      THR: THR: (P) Other (see comment)(110 to 120 BPM) HbA1C < 7%: (P) Yes Resting BP: (P) 129/66    Angina with Exercise?   Angina with Exercise: (P) No   BMI < 25: (P) Yes Hypertension Education      Dietary Goal: (P) >=58 Rate Your Plate     Resistance Training? Resistance Training: (P) Yes Rate your Plate: (P) Pre(53) Lectures/ Videos/ Cooking School: (P) Yes    Education Intervention Hypertension Goals    (P) Yes Dietician Consult/Class: (P) Yes(Scheduled for March 13.) Medication Adherence : (P) Yes    (P) Exercise Safety, S/S to Report, RPE Scale, Warm Up / Cool Down, Physically Active Nurse/Patient Discussion: (P) Yes Home Self Monitoring : (P) Yes    Exercise “Target Goals” Diabetes Ed Referral: (P) N/A     Start Individual Exercise Rx (P) Yes Discuss Maintenance /Wt Loss: (P) Yes       Attend Cooking School: (P) Yes     BP < 140/90 or < 130/80 if DM or CKD (P) Yes Education       Nutrition Education: (P) Healthy Plant Based Eating, Sodium Reduction Cooking/ Lectures/ Cooking School/  RD 1:1     Aerobic activity 30 + min / day 5 days / wk (P) Yes        Exercise    Current : When Randee is at [facility] she completes aerobics for 30 minutes plus a group exercise routine of push then pull. On the days Randee is at home she rides a bike for 30 minutes and/ or completes a strength routine with bands and weights.  Goal:  Increase workloads  Progress:  Randee has been coming to [facility] consistenly and has been improving her strength during weights at [facility].     Nutrition     Current: Randee says she has been eating healthy. Sometimes she finds it hard to cook for one but she has been getting it done. Randee has no problem eating salads, veggies and fruits. Randee admoits that sometimes she splurges and eats a bag of backed chips.  Goal:  Randee has reached her goal incoorporating more healthy fruits and veggies..  Progress:  Randee has  "been reaching her dietary goals.     Hypertension     Current:  129/66 WNL.  Goal:  Low sodium diet; home monitoring.  Progress:  No added salt diet for the past several years and is not on meds.     Stress     Current :  Denies stress.  \"I don't allow stress to enter my life.\"  Goal:  Keep a healthy mindset during this COVID-19 crisis.  Progress: Randee says her mental health is really good right now.     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  We spoke about the above 5 days ago.  A message was left about our new possible date to re-open our gym on 4/20/2020.  She was assured that we will be calling and checking on her weekly and that we will keep her updated to changes in our re-opening date.                 "

## 2020-07-20 ENCOUNTER — NON-PROVIDER VISIT (OUTPATIENT)
Dept: CARDIOLOGY | Facility: MEDICAL CENTER | Age: 85
End: 2020-07-20
Payer: COMMERCIAL

## 2020-07-20 DIAGNOSIS — I48.20 CHRONIC ATRIAL FIBRILLATION (HCC): ICD-10-CM

## 2020-07-20 LAB — EKG IMPRESSION: NORMAL

## 2020-07-20 PROCEDURE — G0422 INTENS CARDIAC REHAB W/EXERC: HCPCS | Performed by: INTERNAL MEDICINE

## 2020-07-20 PROCEDURE — G0423 INTENS CARDIAC REHAB NO EXER: HCPCS | Mod: 59 | Performed by: INTERNAL MEDICINE

## 2020-07-20 NOTE — PROGRESS NOTES
Video watched: 31 Decoding Lab Results. Length: 32.7 minutes.  Video and discussion took place from 8 am to 8:50 am.

## 2020-07-22 ENCOUNTER — NON-PROVIDER VISIT (OUTPATIENT)
Dept: CARDIOLOGY | Facility: MEDICAL CENTER | Age: 85
End: 2020-07-22
Payer: COMMERCIAL

## 2020-07-22 DIAGNOSIS — I48.20 CHRONIC ATRIAL FIBRILLATION (HCC): ICD-10-CM

## 2020-07-22 LAB — EKG IMPRESSION: NORMAL

## 2020-07-22 PROCEDURE — G0422 INTENS CARDIAC REHAB W/EXERC: HCPCS | Performed by: INTERNAL MEDICINE

## 2020-07-22 PROCEDURE — G0423 INTENS CARDIAC REHAB NO EXER: HCPCS | Mod: 59 | Performed by: INTERNAL MEDICINE

## 2020-07-22 NOTE — PROGRESS NOTES
Video watched: 22 Biology of Weight Control. Length: 35.4 minutes with discussion following.  From 0800 To 0850.

## 2020-07-27 ENCOUNTER — NON-PROVIDER VISIT (OUTPATIENT)
Dept: CARDIOLOGY | Facility: MEDICAL CENTER | Age: 85
End: 2020-07-27
Payer: COMMERCIAL

## 2020-07-27 DIAGNOSIS — I48.20 CHRONIC ATRIAL FIBRILLATION (HCC): ICD-10-CM

## 2020-07-27 LAB — EKG IMPRESSION: NORMAL

## 2020-07-27 PROCEDURE — G0423 INTENS CARDIAC REHAB NO EXER: HCPCS | Mod: 59 | Performed by: INTERNAL MEDICINE

## 2020-07-27 PROCEDURE — G0422 INTENS CARDIAC REHAB W/EXERC: HCPCS | Performed by: INTERNAL MEDICINE

## 2020-07-27 NOTE — PROGRESS NOTES
Randee WILLIS Rigoberto attended: cooking school from 0800 to 0850.  Today  prepared Hummus.    Patient received handouts and nutrition information regarding the specific recipes.

## 2020-07-27 NOTE — PROGRESS NOTES
Individualized Treatment Plan   Cardiac Rehab Individual Treatment Plan  Initial/Reassessment/Discharge Assessment/ ReAssessment/ Discharge: (P) 180 Day 20 Session #     (P 32   MRN: [id number] Allergies: Penicillins   Patient Name: Randee Franklin : 9/3/1934 Risk Stratification: (P) High    Diagnoses:   1. Chronic atrial fibrillation (HCC)     Age: 85 y.o. Physician: Rosaline Kwong M.D.    Date of Event: (P) 01/10/20 Specialist: (P) David   Risk Factors:  (P) Hyperlipidemia, Age, Female > 55   Exercise Nutrition Other Core Components/ Risk Factors Psychosocial   Stages of change Stages of change Stages of change Stages of change   (P) Preparation (P) Preparation (P) Preparation (P) Preparation   Fitness Test Lipids Learning Barriers Plan   DASI: (P) (n/a) Available: (P) Yes Learning Barriers: (P) Vision, Ready to Learn Psychosocial Plan: (P) Yes   DIST: (P) (pre and post only) Date: (P) 01/10/20 Family Support Intervention   Max HR: (P) (pre and post only) Total: (P) 162 mg/dL (P) Yes Behavioral Health Consult: (P) Yes   RPE: (P) (pre and post only) Trig: (P) 97 mg/dL Lives: (P) Alone(Has two daughters that live in the area.) Physician Referral: (P) No   SPO2: (P) (pre and post only) HDL: (P) 71 mg/dL Other Risk Factors Plan Identifies Stressors: (P) Yes   MET: (P) (pre and post only) LDL: (P) 72 mg/dL Other Risk Factors/Plan: (P) Yes Drug Intervention: (P) N/A   EF= (P) (not available) Diabetes Tobacco Use Outcome Survey Tools   Ambulatory Status Diabetes: (P) No Social History     Tobacco Use   Smoking Status Never Smoker    FP QOL Overall Score: (P) (pre and post only)   Fall Risk Assessed: (P) Yes HbA1C: (P) 5.8 % Date: (P) 01/10/20 Smoking Intervention PHQ-9: (P) (pre and post only)   Exercise Ambulatory Status Assist Devices: (P) None Monitors BS at Home: (P) No Smoking Cessation Referral: (P) N/A     Intervention Frequency: (P) (n/a) Random BS: (P) 87(1/10/2020) Ind. Education / Counseling:  "(P) N/A Education   Intervention: (P) Yes    MBSR Lectures/Videos: (P) Yes   Exercise Prescription/ Exercise Plan       Mode: (P) Treadmill, NuStep, UBE, Airdyne Weight Management Tobacco Adjunct: (P) N/A Psychosocial Education: (P) Coping Techniques, Positive Support System, S/S Depression, MBSR Lectures   Frequency: (P) 3 days/week Weight: (P) 77.6 kg (171 lb) Target Goal Target Goal   Duration: (P) 30 mins Height: (P) 170.2 cm (5' 7\") Complete Tobacco Cessation: Assess presence or absence of depression using a valid screening: (P) Yes   Intensity: (P) 11-13 RPE BMI (Calculated): (P) 26.78 Complete Tobacco Cessation: (P) N/A Use Stress Management: (P) Yes   METS: (P) 3.0-5.0 Goal weight: (P) 155lbs Intervention Adverse Events: (P) Yes   Progression: (P) ^ increments of 1-3 to THR/RPE as tolerated Waist: (P) 36 inch Healthy Heart Education: (P) Cooking School Attended, Medications Reviewed, Patient Education Binder Provided, Risk Factors Discussed, Videos Viewed per Nick, Workshops Attended Unexpected Events: (P) Yes      Goals     THR: THR: (P) Other (see comment)(110 to 120 BPM) Social History     Substance and Sexual Activity   Alcohol Use No    Educate / Review and have understanding of cardiac disease prevention:    Angina with Exercise? Angina with Exercise: (P) No Nutrition Plan Educate / Review and have understanding of cardiac disease prevention: (P) Yes      Nutrition Plan: (P) Yes Medication Compliance: (P) Yes    Resistance Training? Resistance Training: (P) Yes Intervention Hypertension Intervention      Dietician Consult/Class: (P) Yes(Scheduled for March 13.)     Goals Nurse/Patient Discussion: (P) Yes Weekly Lectures/ Videos/ Interactive Cooking School: (P) Yes    Home Exercise: (P) Yes Diabetes Ed Referral: (P) N/A Hypertension Management    Mode: (P) Walk, Other(Dance) Discuss Maintenance /Wt Loss: (P) Yes Resting BP: (P) 125/99    Duration: (P) 20 to 30 mins Attend Cooking School: (P) Yes   " "  Frequency: (P) Stay active 7 days a week; Walk or dancing for 10 mins everyday.  ICR 3 days a week Dietary Goal: (P) >=58 Rate Your Plate    Education Education    (P) Yes Nutrition Education: (P) Healthy Plant Based Eating, Sodium Reduction Cooking/ Lectures/ Cooking School/  RD 1:1     (P) Exercise Safety, S/S to Report, RPE Scale, Warm Up / Cool Down, Physically Active Target Goal    Target Goal LDL-C < 100 if trig. > 200:  (P) Yes    Start Individual Exercise Rx (P) Yes LDL-C < 70 for high risk patient:  (P) Yes    BP < 140/90 or < 130/80 if DM or CKD (P) Yes Non HDL-C Should be < 130:  (P) Yes    Aerobic activity 30 + min / day 5 days / wk (P) Yes HbA1C < 7%: (P) Yes      BMI < 25: (P) Yes      Exercise     Current: :While Randee is at [facility] she completes aerobics for 30 minutes plus a group exercise routine of push then pull. On the days Randee is at home she rides a bike for 30 minutes and/ or completes a strength routine with bands and weights.  Goal: To continue her exercise routine.  Progress: Randee has been coming to [facility] consistenly and has been improving her strength during weights at [facility].     Nutrition     Current:  Randee says she has been eating healthy. Sometimes she finds it hard to cook for one but she has been getting it done. Randee has no problem eating salads, veggies and fruits. Randee admoits that sometimes she splurges and eats a bag of backed chips.  Goal:  Randee has reached her goal incoorporating more healthy fruits and veggies..  Progress:  Randee has been reaching her dietary goals.     Hypertension     Current: 125/99 WNL.  Goal:  Low sodium diet; home monitoring.  Progress: No added salt diet for the past several years and is not on meds.     Stress     Current: Denies stress.  \"I don't allow stress to enter my life.\"  Goal: Keep a healthy mindset during this COVID-19 crisis.  Progress: Randee says her mental health is really good right now. It was hard at first not socializing with friends and " family, however Randee just figured out how to Skype.     Other     Notes: Randee is getting ready to graduate in 4 sessions. Staff will discuss Randee's graduation goals at that time.

## 2020-07-29 ENCOUNTER — NON-PROVIDER VISIT (OUTPATIENT)
Dept: CARDIOLOGY | Facility: MEDICAL CENTER | Age: 85
End: 2020-07-29
Payer: COMMERCIAL

## 2020-07-29 DIAGNOSIS — I48.20 CHRONIC ATRIAL FIBRILLATION (HCC): ICD-10-CM

## 2020-07-29 LAB — EKG IMPRESSION: NORMAL

## 2020-07-29 PROCEDURE — G0422 INTENS CARDIAC REHAB W/EXERC: HCPCS | Performed by: INTERNAL MEDICINE

## 2020-07-29 PROCEDURE — G0423 INTENS CARDIAC REHAB NO EXER: HCPCS | Mod: 59 | Performed by: INTERNAL MEDICINE

## 2020-07-29 NOTE — PROGRESS NOTES
Video watched: 03 Move it. Length: 32.9 minutes.  Video and discussion took place from  8:15 to 9:00 am.  We had her do a 6 minute walk test prior to her education.

## 2020-08-03 ENCOUNTER — NON-PROVIDER VISIT (OUTPATIENT)
Dept: CARDIOLOGY | Facility: MEDICAL CENTER | Age: 85
End: 2020-08-03
Payer: COMMERCIAL

## 2020-08-03 DIAGNOSIS — I48.20 CHRONIC ATRIAL FIBRILLATION (HCC): ICD-10-CM

## 2020-08-03 LAB — EKG IMPRESSION: NORMAL

## 2020-08-03 PROCEDURE — G0422 INTENS CARDIAC REHAB W/EXERC: HCPCS | Performed by: INTERNAL MEDICINE

## 2020-08-03 PROCEDURE — G0423 INTENS CARDIAC REHAB NO EXER: HCPCS | Mod: 59 | Performed by: INTERNAL MEDICINE

## 2020-08-03 NOTE — PROGRESS NOTES
Randee Franklin attended: Exercise Workshop from 8  to 8:50 am.    The topic was: Resistance and Core Strength Training.    Patient received handouts regarding the specific exercise information

## 2020-08-10 ENCOUNTER — NON-PROVIDER VISIT (OUTPATIENT)
Dept: CARDIOLOGY | Facility: MEDICAL CENTER | Age: 85
End: 2020-08-10
Payer: COMMERCIAL

## 2020-08-10 DIAGNOSIS — I48.20 CHRONIC ATRIAL FIBRILLATION (HCC): ICD-10-CM

## 2020-08-10 LAB — EKG IMPRESSION: NORMAL

## 2020-08-10 PROCEDURE — G0422 INTENS CARDIAC REHAB W/EXERC: HCPCS | Performed by: INTERNAL MEDICINE

## 2020-08-10 PROCEDURE — G0423 INTENS CARDIAC REHAB NO EXER: HCPCS | Mod: 59 | Performed by: INTERNAL MEDICINE

## 2020-08-10 ASSESSMENT — PATIENT HEALTH QUESTIONNAIRE - PHQ9: SUM OF ALL RESPONSES TO PHQ QUESTIONS 1-9: 3

## 2020-08-10 NOTE — PROGRESS NOTES
Individualized Treatment Plan   Cardiac Rehab Individual Treatment Plan  Initial/Reassessment/Discharge Assessment/ ReAssessment/ Discharge: 180 Day 08/10/20 Session #     32   MRN: [id number] Allergies: Penicillins   Patient Name: Randee Franklin : 9/3/1934 Risk Stratification: High    Diagnoses:   1. Chronic atrial fibrillation (HCC)     Age: 85 y.o. Physician: Rosaline Kwong M.D.    Date of Event: 01/10/20 Specialist: David   Risk Factors:  Hyperlipidemia, Age, Female > 55   Exercise Nutrition Other Core Components/ Risk Factors Psychosocial   Stages of change Stages of change Stages of change Stages of change   Preparation Preparation Preparation Preparation   Fitness Test Lipids Learning Barriers Plan   DASI: (n/a) Available: Yes Learning Barriers: Vision, Ready to Learn Psychosocial Plan: Yes   DIST: (pre and post only) Date: 01/10/20 Family Support Intervention   Max HR: (pre and post only) Total: 162 mg/dL Yes Behavioral Health Consult: Yes   RPE: (pre and post only) Tri mg/dL Lives: Alone(Has two daughters that live in the area.) Physician Referral: No   SPO2: (pre and post only) HDL: 71 mg/dL Other Risk Factors Plan Identifies Stressors: Yes   MET: (pre and post only) LDL: 72 mg/dL Other Risk Factors/Plan: Yes Drug Intervention: N/A   EF= (not available) Diabetes Tobacco Use Outcome Survey Tools   Ambulatory Status Diabetes: No Social History     Tobacco Use   Smoking Status Never Smoker    FP QOL Overall Score: (pre and post only)   Fall Risk Assessed: Yes HbA1C: 5.8 % Date: 01/10/20 Smoking Intervention PHQ-9: (pre and post only)   Exercise Ambulatory Status Assist Devices: None Monitors BS at Home: No Smoking Cessation Referral: N/A     Intervention Frequency: (n/a) Random BS: 87(1/10/2020) Ind. Education / Counseling: N/A Education   Intervention: Yes    MBSR Lectures/Videos: Yes   Exercise Prescription/ Exercise Plan       Mode: Treadmill, NuStep, UBE, Airdyne Weight Management Tobacco  "Adjunct: N/A Psychosocial Education: Coping Techniques, Positive Support System, S/S Depression, MBSR Lectures   Frequency: 3 days/week Weight: 77.6 kg (171 lb) Target Goal Target Goal   Duration: 30 mins Height: 170.2 cm (5' 7\") Complete Tobacco Cessation: Assess presence or absence of depression using a valid screening: Yes   Intensity: 11-13 RPE BMI (Calculated): 26.78 Complete Tobacco Cessation: N/A Use Stress Management: Yes   METS: 3.0-5.0 Goal weight: 155lbs Intervention Adverse Events: Yes   Progression: ^ increments of 1-3 to THR/RPE as tolerated Waist: 36 inch Healthy Heart Education: Cooking School Attended, Medications Reviewed, Patient Education Binder Provided, Risk Factors Discussed, Videos Viewed per Nick, Workshops Attended Unexpected Events: Yes      Goals     THR: THR: Other (see comment)(110 to 120 BPM) Social History     Substance and Sexual Activity   Alcohol Use No    Educate / Review and have understanding of cardiac disease prevention:    Angina with Exercise? Angina with Exercise: No Nutrition Plan Educate / Review and have understanding of cardiac disease prevention: Yes      Nutrition Plan: Yes Medication Compliance: Yes    Resistance Training? Resistance Training: Yes Intervention Hypertension Intervention      Dietician Consult/Class: Yes(Scheduled for March 13.)     Goals Nurse/Patient Discussion: Yes Weekly Lectures/ Videos/ Interactive Cooking School: Yes    Home Exercise: Yes Diabetes Ed Referral: N/A Hypertension Management    Mode: Walk, Other(Dance) Discuss Maintenance /Wt Loss: Yes Resting BP: 125/99    Duration: 20 to 30 mins Attend Cooking School: Yes     Frequency: Stay active 7 days a week; Walk or dancing for 10 mins everyday.  ICR 3 days a week Dietary Goal: >=58 Rate Your Plate    Education Education    Yes Nutrition Education: Healthy Plant Based Eating, Sodium Reduction Cooking/ Lectures/ Cooking School/  RD 1:1     Exercise Safety, S/S to Report, RPE Scale, Warm Up " "/ Cool Down, Physically Active Target Goal    Target Goal LDL-C < 100 if trig. > 200:  Yes    Start Individual Exercise Rx Yes LDL-C < 70 for high risk patient:  Yes    BP < 140/90 or < 130/80 if DM or CKD Yes Non HDL-C Should be < 130:  Yes    Aerobic activity 30 + min / day 5 days / wk Yes HbA1C < 7%: Yes      BMI < 25: Yes           Exercise     Current: Current : While Randee is at [facility] she completes aerobics for 30 minutes plus a group exercise routine of push then pull. On the days Randee is at home she rides a bike for 30 minutes and/ or completes a strength routine with bands and weights.  Goal: Goal:  To continue her exercise routine.  Progress: Progress: Randee has been coming to [facility] consistenly and has been improving her strength during weights at [facility].     Nutrition     Current: Current: Randee says she has been eating healthy. Sometimes she finds it hard to cook for one but she has been getting it done. Randee has no problem eating salads, veggies and fruits. Randee admoits that sometimes she splurges and eats a bag of backed chips.  Goal: Goal: Randee has reached her goal incoorporating more healthy fruits and veggies..  Progress: Progress: Randee has been reaching her dietary goals.     Hypertension     Current: Current: 125/99 WNL.  Goal: Goal: Low sodium diet; home monitoring.  Progress: Progress: No added salt diet for the past several years and is not on meds.     Stress     Current: Current : Denies stress.  \"I don't allow stress to enter my life.\"  Goal: Goal: Keep a healthy mindset during this COVID-19 crisis.  Progress: Progress: Randee says her mental health is really good right now. It was hard at first not socializing with friends and family, however Randee just figured out how to Skype.     Other     Notes: Randee is getting ready to graduate in 4 sessions. Staff will discuss Randee's graduation goals at that time.            "

## 2020-08-10 NOTE — PROGRESS NOTES
Randee Franklin attended the following workshop from 8:00 to 8:50AM  Workshop Title: Targeting Nutrition Priorities      Lecture was attended and patient questions addressed. The patient will continue workshops and nutrition education.

## 2021-01-14 DIAGNOSIS — Z23 NEED FOR VACCINATION: ICD-10-CM

## 2024-06-05 NOTE — PROGRESS NOTES
No Randee Franklin attended: Exercise Workshop from 10 to 11 am.      The topic was: Improved Performance.    Patient received handouts regarding the specific exercise information